# Patient Record
Sex: MALE | Race: WHITE | NOT HISPANIC OR LATINO
[De-identification: names, ages, dates, MRNs, and addresses within clinical notes are randomized per-mention and may not be internally consistent; named-entity substitution may affect disease eponyms.]

---

## 2024-08-06 ENCOUNTER — APPOINTMENT (OUTPATIENT)
Dept: ORTHOPEDIC SURGERY | Facility: CLINIC | Age: 44
End: 2024-08-06

## 2024-08-06 PROBLEM — Z00.00 ENCOUNTER FOR PREVENTIVE HEALTH EXAMINATION: Status: ACTIVE | Noted: 2024-08-06

## 2024-08-06 PROBLEM — E78.00 HIGH CHOLESTEROL: Status: RESOLVED | Noted: 2024-08-06 | Resolved: 2024-08-06

## 2024-08-06 PROCEDURE — 99204 OFFICE O/P NEW MOD 45 MIN: CPT

## 2024-08-18 PROBLEM — S69.90XA HAND INJURY: Status: ACTIVE | Noted: 2024-08-18

## 2024-08-18 NOTE — HISTORY OF PRESENT ILLNESS
[de-identified] : 44M, RHD presents with right hand pain. Patient reports injuring hand while pushing a door on 7/15/24, went to [facility] had XR imaging done. Admits to experiencing sharp pain that worsens with activity. Occasional numbness/ tingling.

## 2024-09-09 ENCOUNTER — APPOINTMENT (OUTPATIENT)
Dept: ORTHOPEDIC SURGERY | Facility: CLINIC | Age: 44
End: 2024-09-09